# Patient Record
Sex: MALE | Race: WHITE
[De-identification: names, ages, dates, MRNs, and addresses within clinical notes are randomized per-mention and may not be internally consistent; named-entity substitution may affect disease eponyms.]

---

## 2020-06-27 ENCOUNTER — HOSPITAL ENCOUNTER (EMERGENCY)
Dept: HOSPITAL 75 - ER FS | Age: 60
Discharge: HOME | End: 2020-06-27
Payer: COMMERCIAL

## 2020-06-27 VITALS — SYSTOLIC BLOOD PRESSURE: 107 MMHG | DIASTOLIC BLOOD PRESSURE: 61 MMHG

## 2020-06-27 VITALS — BODY MASS INDEX: 39.79 KG/M2 | WEIGHT: 253.53 LBS | HEIGHT: 67.01 IN
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
## 2020-06-27 DIAGNOSIS — S49.92XA: Primary | ICD-10-CM

## 2020-06-27 PROCEDURE — 73030 X-RAY EXAM OF SHOULDER: CPT

## 2020-06-27 PROCEDURE — 71045 X-RAY EXAM CHEST 1 VIEW: CPT

## 2020-06-27 PROCEDURE — 99283 EMERGENCY DEPT VISIT LOW MDM: CPT

## 2020-06-27 PROCEDURE — 72040 X-RAY EXAM NECK SPINE 2-3 VW: CPT

## 2020-06-27 NOTE — ED UPPER EXTREMITY
General


Chief Complaint:  Upper Extremity


Stated Complaint:  LT SHOULDER PAIN; FALL


Nursing Triage Note:  


Patient states he fell from a 4-5 foot height on Thursday, states he landed on 


his left shoulder. He states he is having difficulty moving his left arm d/t 


pain in his left shoulder.


Nursing Sepsis Screen:  No Definite Risk


Source:  patient


Exam Limitations:  no limitations





History of Present Illness


Date Seen by Provider:  2020


Time Seen by Provider:  14:00


Initial Comments


The patient is a pleasant 60-year-old male presents for evaluation of left 

shoulder pain. He states that on Thursday, 2 days ago, he was on a ladder 

approximately 4 feet off the ground when he fell and injured his left shoulder. 

He is having difficulty moving the shoulder fully without significant 

discomfort. He also has some soreness of the left upper back and the left 

lateral neck. He is alert and oriented 4, calm, and appears to be in no 

distress. He did not hit his head or lose consciousness and has no other 

complaints.


Onset:  other (2 days ago)


Severity:  moderate


Pain/Injury Location:  left shoulder


Method of Injury:  fell


Modifying Factors:  Improves With Movement (makes it worse), Improves With Rest 

(makes it better)





Allergies and Home Medications


Allergies


Coded Allergies:  


     No Known Allergies (Verified  Allergy, Unknown, 08)





Patient Home Medication List


Home Medication List Reviewed:  Yes





Review of Systems


Constitutional:  no symptoms reported


EENTM:  no symptoms reported


Respiratory:  no symptoms reported


Cardiovascular:  no symptoms reported


Gastrointestinal:  no symptoms reported


Genitourinary:  no symptoms reported


Musculoskeletal:  joint pain (left shoulder)


Skin:  no symptoms reported


Psychiatric/Neurological:  No Symptoms Reported





All Other Systems Reviewed


Negative Unless Noted:  Yes





Past Medical-Social-Family Hx


Past Med/Social Hx:  Reviewed Nursing Past Med/Soc Hx


Patient Social History


Alcohol Use:  Denies Use


Recreational Drug Use:  No


Smoking Status:  Never a Smoker


2nd Hand Smoke Exposure:  No


Recent Foreign Travel:  No


Contact w/Someone Who Travel:  No


Recent Infectious Disease Expo:  No


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


Physical Abuse:  No


Sexual Abuse:  No


Mistreated:  No


Fear:  No





Past Medical History


Surgeries:  Yes (ORTHOSCOPIC SURGERY RIGHT KNEE)


Respiratory:  No


Cardiac:  No


Neurological:  Yes


Reproductive Disorders:  No


Genitourinary:  No


Gastrointestinal:  No


Musculoskeletal:  No


Endocrine:  Yes


Diabetes, Insulin dep


HEENT:  No


Cancer:  No


Psychosocial:  No


Integumentary:  No


Blood Disorders:  No





Physical Exam


Vital Signs





Vital Signs - First Documented








 20





 13:59


 


Temp 37.0


 


Pulse 68


 


Resp 18


 


B/P (MAP) 117/67 (84)


 


Pulse Ox 100


 


O2 Delivery Room Air





Capillary Refill : Less Than 3 Seconds


Height, Weight, BMI


Height: '"


Weight: lbs. oz. kg; 39.00 BMI


Method:


General Appearance:  WD/WN, no apparent distress


HEENT:  PERRL/EOMI, pharynx normal


Neck:  full range of motion, supple, other (lateral tenderness along the 

musculature lateral to the spine, no midline cervical tenderness is present)


Cardiovascular:  regular rate, rhythm, no edema


Respiratory:  chest non-tender, lungs clear, normal breath sounds, no 

respiratory distress


Gastrointestinal:  normal bowel sounds, non tender, soft


Back:  no CVA tenderness, no vertebral tenderness, other (some tenderness is 

present over the top of the left shoulder over the left trapezius)


Shoulder:  limited ROM (abduction is limited to approximately 90, there is 

apparent discomfort with range of motion testing), soft tissue tenderness (on 

top of the left shoulder over the trapezius, CMS intact distal to the injury, 

excellent  strength, no wrist drop, no clavicle tenderness)


Elbow/Forearm:  normal inspection


Wrist:  Yes normal inspection, Yes non-tender, Yes no evidence of injury


Hand:  normal inspection, non-tender, no evidence of injury, normal ROM


Neurologic/Psychiatric:  CNs II-XII nml as tested, no motor/sensory deficits, 

alert, normal mood/affect, oriented x 3


Skin:  normal color, warm/dry





Progress/Results/Core Measures


Results/Orders


My Orders





Orders - MARÍA ELENA INIGUEZ DO


Shoulder 3 View Left (20 14:08)


Cervical Spine 3 View Or Less (20 14:08)


Chest 1 View Ap/Pa Only (20 14:08)


Ed Ortho/Other Supplies Order (20 14:08)


Ice: Apply To Affected Area (20 14:08)


Hydrocodone/Apap 5/325 Tablet (Lortab 5 (20 14:15)





Medications Given in ED





Current Medications








 Medications  Dose


 Ordered  Sig/Mary Jo


 Route  Start Time


 Stop Time Status Last Admin


Dose Admin


 


 Acetaminophen/


 Hydrocodone Bitart  1 tab  ONCE  ONCE


 PO  20 14:15


 20 14:16 DC 20 14:39


1 TAB








Vital Signs/I&O











 20





 13:59


 


Temp 37.0


 


Pulse 68


 


Resp 18


 


B/P (MAP) 117/67 (84)


 


Pulse Ox 100


 


O2 Delivery Room Air














Blood Pressure Mean:                    84











Progress


Progress Note :  


Progress Note


@1505 - patient updated on imaging results which are acutely unremarkable. He 

has been given a sling for comfort. The patient likely has an internal 

derangement of the left shoulder and/or a shoulder sprain or rotator cuff 

injury. Advised the patient to follow up with orthopedics within the next 1-2 

days and to return to the Emergency Department immediately new or worsening 

symptoms. The patient expresses verbal understanding and agreement with the plan

and is stable for discharge home at this time.





Diagnostic Imaging





   Diagonstic Imaging:  Xray


Comments


                 [facility] [place], [place]





NAME:   JAYDEN TAPIA


[facility] REC#:   [id number]


ACCOUNT#:   [id number]


PT STATUS:   REG ER


:   1960


PHYSICIAN:   MARÍA ELENA INIGUEZ DO


ADMIT DATE:   20/ER FS


                                  ***Signed***


Date of Exam:20





SHOULDER 3 VIEW LEFT








Indication:  Fall, left shoulder pain.





Comparison:  None.





Findings:





3 views of the left shoulder demonstrate mild degenerative joint


disease of a single humeral joint. There is no fracture,


dislocation or osseous lesion.





Impression: No fracture identified.





Dictated by: 





  Dictated on workstation # [id number]








Dict:   20 1445


Trans:   20 1453


[facility] [id number]





Interpreted by:     ANABELLE ORTA


Electronically signed by: ANABELLE ORTA 20 1453





Departure


Impression





   Primary Impression:  


   Injury of left shoulder


Disposition:  01 HOME, SELF-CARE


Condition:  Stable





Departure-Patient Inst.


Decision time for Depature:  15:05


Referrals:  


SCHWAB,TERRY D MD


Patient Instructions:  Rotator Cuff Injury (DC), How to Use a Shoulder Sling, 

Shoulder Pain (DC)





Add. Discharge Instructions:  


Take the prescribed medicine as directed. Follow-up with Dr. Schwab from 

orthopedics in the next 2-3 days. Continue to will wear the sling until 

orthopedics says otherwise. Return to the emergency Department immediately for 

new or worsening symptoms.


Scripts


Hydrocodone/Acetaminophen (Hydrocodone-Acetamin 5-325 mg) 1 Each Tablet


1 EACH PO Q4H for Pain for 5 Days, #15 TAB


   Prov: MARÍA ELENA INIGUEZ DO         20











MARÍA ELENA INIGUEZ DO              2020 14:17

## 2020-06-27 NOTE — DIAGNOSTIC IMAGING REPORT
INDICATION: Fall, neck pain. 



COMPARISON: None.



EXAMINATION: Four views of the cervical column were obtained.



FINDINGS: Cervical column has normal alignment. There is no

subluxation or fracture. Mild-to-moderate degenerative change is

seen throughout the disc spaces and facet joints. Carotid artery

calcifications are present. 



IMPRESSION: No traumatic malalignment or fracture. 



Dictated by: 



  Dictated on workstation # [id number]

## 2020-06-27 NOTE — DIAGNOSTIC IMAGING REPORT
Indication:  Fall, left shoulder pain.



Comparison:  None.



Findings:



3 views of the left shoulder demonstrate mild degenerative joint

disease of a single humeral joint. There is no fracture,

dislocation or osseous lesion.



Impression: No fracture identified.



Dictated by: 



  Dictated on workstation # [id number]

## 2020-06-27 NOTE — DIAGNOSTIC IMAGING REPORT
Indication:  Fall, chest pain.



Comparison:  None.



Findings:



Single view of the chest demonstrates clear lungs bilaterally.

The heart is normal. There is no pneumothorax but osseous

structures are age-appropriate.



Impression: Negative chest. Please note the chest is rotated or

flipped incorrectly and mislabeled. There is no dextrocardia.



Dictated by: 



  Dictated on workstation # [id number]